# Patient Record
Sex: FEMALE | Race: WHITE | Employment: FULL TIME | URBAN - METROPOLITAN AREA
[De-identification: names, ages, dates, MRNs, and addresses within clinical notes are randomized per-mention and may not be internally consistent; named-entity substitution may affect disease eponyms.]

---

## 2021-08-03 ENCOUNTER — HOSPITAL ENCOUNTER (OUTPATIENT)
Age: 36
Discharge: HOME | End: 2021-08-03
Payer: COMMERCIAL

## 2021-08-03 VITALS
TEMPERATURE: 96 F | DIASTOLIC BLOOD PRESSURE: 85 MMHG | SYSTOLIC BLOOD PRESSURE: 133 MMHG | RESPIRATION RATE: 18 BRPM | HEART RATE: 110 BPM | OXYGEN SATURATION: 99 %

## 2021-08-03 DIAGNOSIS — Z20.822 ENCOUNTER FOR SCREENING LABORATORY TESTING FOR COVID-19 VIRUS: ICD-10-CM

## 2021-08-03 DIAGNOSIS — R05.9 COUGH: ICD-10-CM

## 2021-08-03 DIAGNOSIS — J02.0 STREPTOCOCCAL PHARYNGITIS: Primary | ICD-10-CM

## 2021-08-03 LAB — S PYO AG THROAT QL: POSITIVE

## 2021-08-03 PROCEDURE — 87880 STREP A ASSAY W/OPTIC: CPT | Performed by: NURSE PRACTITIONER

## 2021-08-03 PROCEDURE — 99213 OFFICE O/P EST LOW 20 MIN: CPT | Performed by: NURSE PRACTITIONER

## 2021-08-03 RX ORDER — AMOXICILLIN 500 MG/1
500 TABLET, FILM COATED ORAL 2 TIMES DAILY
Qty: 20 TABLET | Refills: 0 | Status: SHIPPED | OUTPATIENT
Start: 2021-08-03 | End: 2021-08-13

## 2021-08-03 NOTE — ED PROVIDER NOTES
Patient Seen in: Immediate Two [facility]      History   No chief complaint on file. Stated Complaint: Covid testing    HPI/Subjective:   HPI    This is a 77-year-old female presenting for Covid testing.   Patient states, she has been having a slight no Lymphadenopathy:      Cervical: No cervical adenopathy. Skin:     General: Skin is warm and dry. Capillary Refill: Capillary refill takes less than 2 seconds. Neurological:      General: No focal deficit present.       Mental Status: She is alert (two) times daily for 10 days. , Normal, Disp-20 tablet, R-0

## 2021-08-04 LAB — SARS-COV-2 RNA RESP QL NAA+PROBE: NOT DETECTED

## 2024-06-07 ENCOUNTER — OFFICE VISIT (OUTPATIENT)
Dept: OTOLARYNGOLOGY | Facility: CLINIC | Age: 39
End: 2024-06-07

## 2024-06-07 VITALS — WEIGHT: 183 LBS | BODY MASS INDEX: 30.49 KG/M2 | HEIGHT: 65 IN

## 2024-06-07 DIAGNOSIS — H61.23 CERUMEN DEBRIS ON TYMPANIC MEMBRANE OF BOTH EARS: Primary | ICD-10-CM

## 2024-06-07 DIAGNOSIS — H91.93 DECREASED HEARING OF BOTH EARS: ICD-10-CM

## 2024-06-07 PROCEDURE — 69210 REMOVE IMPACTED EAR WAX UNI: CPT | Performed by: SPECIALIST

## 2024-06-07 PROCEDURE — 3008F BODY MASS INDEX DOCD: CPT | Performed by: SPECIALIST

## 2024-06-07 RX ORDER — PIMECROLIMUS 10 MG/G
1 CREAM TOPICAL 2 TIMES DAILY
COMMUNITY
Start: 2024-01-03

## 2024-06-07 RX ORDER — AZELAIC ACID 0.15 G/G
GEL TOPICAL
COMMUNITY
Start: 2024-04-26

## 2024-06-07 NOTE — PROGRESS NOTES
Swati Yoder is a 38 year old female.   Chief Complaint   Patient presents with    Hearing Loss     Difficulty hearing the last 2-3 months    Ear Problem     Ears feeling full     HPI:   Patient here with decreased hearing.      Current Outpatient Medications   Medication Sig Dispense Refill    pimecrolimus 1 % External Cream Apply 1 Application topically 2 (two) times daily.      Azelaic Acid 15 % External Gel Apply a thin layer to entire face 1-2x daily        History reviewed. No pertinent past medical history.   Social History:  Social History     Socioeconomic History    Marital status:    Tobacco Use    Smoking status: Never    Smokeless tobacco: Never   Vaping Use    Vaping status: Never Used   Substance and Sexual Activity    Alcohol use: Yes     Comment: rarely    Drug use: Never     Social Determinants of Health     Food Insecurity: No Food Insecurity (4/30/2024)    Received from [facility]    Food Insecurity     Currently or in the past 3 months, have you worried your food would run out before you had money to buy more?: No     In the past 12 months, have you run out of food or been unable to get more?: No   Transportation Needs: No Transportation Needs (4/30/2024)    Received from [facility]    Transportation Needs     Medical Transportation Needs?: No    Received from [facility]    Housing Stability        REVIEW OF SYSTEMS:   GENERAL HEALTH: feels well otherwise  GENERAL : denies fever, chills, sweats, weight loss, weight gain  SKIN: denies any unusual skin lesions or rashes  RESPIRATORY: denies shortness of breath with exertion  NEURO: denies headaches    EXAM:   Ht 5' 5\" (1.651 m)   Wt 183 lb (83 kg)   BMI 30.45 kg/m²   System Details   Skin Inspection - Normal.   Constitutional Overall appearance - Normal.   Head/Face Facial features - Normal. Eyebrows - Normal. Skull - Normal.   Eyes Conjunctiva - Right: Normal, Left:  Normal. Pupil - Right: Normal, Left: Normal.    Ears Inspection - Right: Normal, Left: Normal.   Ears = bilateral cerumen occlussions.    Fully cleaned under microscope using instrumentation and suctioning.    Normal tympanic membranes.   Fork 512 Hz right equals left   Nasal External nose - Normal.   Nasal septum - Normal.  Turbinates - Normal.   Oral/Oropharynx Lips - Normal, Tonsils - Normal, Tongue - Normal    Neck Exam Inspection - Normal. Palpation - Normal. Parotid gland - Normal. Thyroid gland - Normal.   Lymph Detail Submental. Submandibular. Anterior cervical. Posterior cervical. Supraclavicular all without enlargement   Psychiatric Orientation - Oriented to time, place, person & situation. Appropriate mood and affect.   Neurological Memory - Normal. Cranial nerves - Cranial nerves II through XII grossly intact.     ASSESSMENT AND PLAN:   1. Cerumen debris on tympanic membrane of both ears  Cleaned.  Right greater than left.  Tuning fork evaluation suggest normal bilateral hearing.    - Removal Impacted Cerumen Requiring Instrumentation, Bilateral    2. Decreased hearing of both ears  Can consider audiogram if hearing is still diminished after cleaning.      The patient indicates understanding of these issues and agrees to the plan.      Aisha Beatty MD  6/7/2024  2:36 PM

## 2024-06-07 NOTE — PATIENT INSTRUCTIONS
Cerumen was fully cleaned from both your ears right greater than left.  Follow-up in 6 months time, sooner if problems.

## 2024-08-26 ENCOUNTER — OFFICE VISIT (OUTPATIENT)
Dept: OBGYN CLINIC | Facility: CLINIC | Age: 39
End: 2024-08-26
Payer: COMMERCIAL

## 2024-08-26 ENCOUNTER — LAB ENCOUNTER (OUTPATIENT)
Dept: LAB | Facility: HOSPITAL | Age: 39
End: 2024-08-26
Attending: ADVANCED PRACTICE MIDWIFE
Payer: COMMERCIAL

## 2024-08-26 VITALS
WEIGHT: 177 LBS | HEART RATE: 68 BPM | DIASTOLIC BLOOD PRESSURE: 82 MMHG | BODY MASS INDEX: 29.49 KG/M2 | SYSTOLIC BLOOD PRESSURE: 115 MMHG | HEIGHT: 65 IN

## 2024-08-26 DIAGNOSIS — O20.9 BLEEDING IN EARLY PREGNANCY (HCC): ICD-10-CM

## 2024-08-26 DIAGNOSIS — N76.0 VAGINOSIS: ICD-10-CM

## 2024-08-26 DIAGNOSIS — O20.9 BLEEDING IN EARLY PREGNANCY (HCC): Primary | ICD-10-CM

## 2024-08-26 DIAGNOSIS — Z32.01 PREGNANCY EXAMINATION OR TEST, POSITIVE RESULT (HCC): ICD-10-CM

## 2024-08-26 DIAGNOSIS — N92.6 MISSED MENSES: ICD-10-CM

## 2024-08-26 LAB
B-HCG SERPL-ACNC: 111.2 MIU/ML
CONTROL LINE PRESENT WITH A CLEAR BACKGROUND (YES/NO): YES YES/NO
PREGNANCY TEST, URINE: POSITIVE
PROGEST SERPL-MCNC: 1.51 NG/ML

## 2024-08-26 PROCEDURE — 3008F BODY MASS INDEX DOCD: CPT | Performed by: ADVANCED PRACTICE MIDWIFE

## 2024-08-26 PROCEDURE — 81025 URINE PREGNANCY TEST: CPT | Performed by: ADVANCED PRACTICE MIDWIFE

## 2024-08-26 PROCEDURE — 3079F DIAST BP 80-89 MM HG: CPT | Performed by: ADVANCED PRACTICE MIDWIFE

## 2024-08-26 PROCEDURE — 99203 OFFICE O/P NEW LOW 30 MIN: CPT | Performed by: ADVANCED PRACTICE MIDWIFE

## 2024-08-26 PROCEDURE — 84144 ASSAY OF PROGESTERONE: CPT | Performed by: ADVANCED PRACTICE MIDWIFE

## 2024-08-26 PROCEDURE — 3074F SYST BP LT 130 MM HG: CPT | Performed by: ADVANCED PRACTICE MIDWIFE

## 2024-08-26 PROCEDURE — 84702 CHORIONIC GONADOTROPIN TEST: CPT | Performed by: ADVANCED PRACTICE MIDWIFE

## 2024-08-26 RX ORDER — CHOLECALCIFEROL (VITAMIN D3) 25 MCG
1 TABLET,CHEWABLE ORAL DAILY
COMMUNITY

## 2024-08-27 LAB
BV BACTERIA DNA VAG QL NAA+PROBE: NEGATIVE
C GLABRATA DNA VAG QL NAA+PROBE: NEGATIVE
C KRUSEI DNA VAG QL NAA+PROBE: NEGATIVE
CANDIDA DNA VAG QL NAA+PROBE: NEGATIVE
T VAGINALIS DNA VAG QL NAA+PROBE: NEGATIVE

## 2024-09-10 NOTE — PROGRESS NOTES
HPI:   Swati Yoder is a 38 year old female who presents for a missed menses visit. Happy about pregnancy.   Chief Complaint   Patient presents with    Gyn Exam     Missed menses,  cramping and bleeding for 4-5 days ,         Wt Readings from Last 3 Encounters:   24 177 lb (80.3 kg)   24 183 lb (83 kg)     Body mass index is 29.45 kg/m².     OB History    Para Term  AB Living   2 1 1     1   SAB IAB Ectopic Multiple Live Births           1      # Outcome Date GA Lbr Juan/2nd Weight Sex Type Anes PTL Lv   2 Current            1 Term 19   8 lb 1 oz (3.657 kg) M NORMAL SPONT   THAI        Current Outpatient Medications   Medication Sig Dispense Refill    prenatal vitamin with DHA 27-0.8-228 MG Oral Cap Take 1 capsule by mouth daily.      pimecrolimus 1 % External Cream Apply 1 Application topically 2 (two) times daily. (Patient not taking: Reported on 2024)      Azelaic Acid 15 % External Gel Apply a thin layer to entire face 1-2x daily (Patient not taking: Reported on 2024)        History reviewed. No pertinent past medical history.   Past Surgical History:   Procedure Laterality Date    Appendectomy  2017    Other surgical history      maxillary jaw bone graft      History reviewed. No pertinent family history.   Social History:   Social History     Socioeconomic History    Marital status:    Tobacco Use    Smoking status: Never    Smokeless tobacco: Never   Vaping Use    Vaping status: Never Used   Substance and Sexual Activity    Alcohol use: Yes     Comment: rarely    Drug use: Never     Social Determinants of Health     Food Insecurity: No Food Insecurity (2024)    Received from [facility]    Food Insecurity     Currently or in the past 3 months, have you worried your food would run out before you had money to buy more?: No     In the past 12 months, have you run out of food or been unable to get more?: No   Transportation Needs:  No Transportation Needs (4/30/2024)    Received from [facility]    Transportation Needs     Medical Transportation Needs?: No    Received from [facility]    Housing Stability        REVIEW OF SYSTEMS:   GENERAL: tired, denies fever, chills and bodyaches.   BREAST: some diffuse breast tenderness  GI: denies abdominal pain, no nausea no vomiting  : denies urinary complaints (frequency, dysuria, urgency), denies malodorous vaginal discharge or itching, reports periods regular prior to missed menses   MUSCULOSKELETAL: denies back pain  PSYCHE: denies depression or anxiety.    EXAM:   /82 (BP Location: Right arm, Patient Position: Sitting, Cuff Size: adult)   Pulse 68   Ht 5' 5\" (1.651 m)   Wt 177 lb (80.3 kg)   LMP 07/15/2024   BMI 29.45 kg/m²   GENERAL: well developed, well nourished,in no apparent distress  LUNGS: normal effort  GI: no masses, hernia or tenderness  NEURO: Oriented times three, motor and sensory are grossly intact    ASSESSMENT AND PLAN:   Swati Yoder is a 38 year old female who presents for a missed menses visit.     1. Bleeding in early pregnancy (HCC)  - HCG, Beta Subunit (Quant Pregnancy Test); Future  - Progesterone; Future  - HCG, Beta Subunit (Quant Pregnancy Test); Standing    2. Missed menses  - POC Urine pregnancy test [52082]    3. Pregnancy examination or test, positive result (HCC)    4. Vaginosis  - Vaginitis Vaginosis PCR Panel; Future  - Vaginitis Vaginosis PCR Panel    Early ultrasound / blood work to confirm viability discussed. Reviewed danger signs of miscarriage and CNM contact information.     Yara Bran CNM  9/10/2024  3:09 PM